# Patient Record
Sex: MALE | Employment: UNEMPLOYED | URBAN - METROPOLITAN AREA
[De-identification: names, ages, dates, MRNs, and addresses within clinical notes are randomized per-mention and may not be internally consistent; named-entity substitution may affect disease eponyms.]

---

## 2023-05-30 ENCOUNTER — OFFICE VISIT (OUTPATIENT)
Dept: ORTHOPEDIC SURGERY | Age: 73
End: 2023-05-30
Payer: MEDICARE

## 2023-05-30 VITALS — WEIGHT: 178 LBS | HEIGHT: 66 IN | BODY MASS INDEX: 28.61 KG/M2

## 2023-05-30 DIAGNOSIS — M51.36 DDD (DEGENERATIVE DISC DISEASE), LUMBAR: ICD-10-CM

## 2023-05-30 DIAGNOSIS — M25.551 RIGHT HIP PAIN: ICD-10-CM

## 2023-05-30 DIAGNOSIS — M47.816 LUMBAR SPONDYLOSIS: ICD-10-CM

## 2023-05-30 DIAGNOSIS — M54.16 LUMBAR RADICULOPATHY: Primary | ICD-10-CM

## 2023-05-30 PROCEDURE — 1123F ACP DISCUSS/DSCN MKR DOCD: CPT | Performed by: PHYSICIAN ASSISTANT

## 2023-05-30 PROCEDURE — 99204 OFFICE O/P NEW MOD 45 MIN: CPT | Performed by: PHYSICIAN ASSISTANT

## 2023-05-30 RX ORDER — HYDROCHLOROTHIAZIDE 25 MG/1
25 TABLET ORAL DAILY
COMMUNITY

## 2023-05-30 RX ORDER — TERBINAFINE HYDROCHLORIDE 250 MG/1
250 TABLET ORAL
COMMUNITY

## 2023-05-30 RX ORDER — OXYBUTYNIN CHLORIDE 10 MG/1
10 TABLET, EXTENDED RELEASE ORAL DAILY
COMMUNITY
Start: 2022-07-05

## 2023-05-30 RX ORDER — DAPAGLIFLOZIN 10 MG/1
TABLET, FILM COATED ORAL
COMMUNITY
Start: 2023-04-29

## 2023-05-30 RX ORDER — TAMSULOSIN HYDROCHLORIDE 0.4 MG/1
0.4 CAPSULE ORAL DAILY
COMMUNITY
Start: 2021-01-21

## 2023-05-30 RX ORDER — LOSARTAN POTASSIUM 50 MG/1
TABLET ORAL
COMMUNITY
Start: 2023-02-28

## 2023-05-30 RX ORDER — LISINOPRIL 10 MG/1
10 TABLET ORAL DAILY
COMMUNITY

## 2023-05-30 RX ORDER — MUPIROCIN CALCIUM 20 MG/G
CREAM TOPICAL 3 TIMES DAILY
COMMUNITY

## 2023-05-30 RX ORDER — GLIMEPIRIDE 4 MG/1
TABLET ORAL
COMMUNITY
Start: 2023-02-28

## 2023-05-30 RX ORDER — MELOXICAM 7.5 MG/1
TABLET ORAL
COMMUNITY
Start: 2023-05-23

## 2023-05-30 RX ORDER — GLIPIZIDE 5 MG/1
TABLET, FILM COATED, EXTENDED RELEASE ORAL
COMMUNITY

## 2023-05-30 RX ORDER — INSULIN LISPRO 100 [IU]/ML
INJECTION, SOLUTION INTRAVENOUS; SUBCUTANEOUS
COMMUNITY

## 2023-05-30 RX ORDER — KETOROLAC TROMETHAMINE 30 MG/ML
INJECTION, SOLUTION INTRAMUSCULAR; INTRAVENOUS
COMMUNITY

## 2023-05-30 RX ORDER — MIRABEGRON 50 MG/1
TABLET, FILM COATED, EXTENDED RELEASE ORAL
COMMUNITY
Start: 2023-05-17

## 2023-05-30 RX ORDER — IBUPROFEN 800 MG/1
TABLET ORAL EVERY 8 HOURS
COMMUNITY

## 2023-05-30 NOTE — PROGRESS NOTES
Name: Jannette Melo  YOB: 1950  Gender: male  MRN: [id number]    CC:   Chief Complaint   Patient presents with    Back Pain         HPI:   Jannette Melo is a 81 y.o. male with a PMHx of multiple comorbidities including diabetic, previous bladder tumor, hypertension. They present here for evaluation of radiating pain to the right hip and leg. Denies any alva LBP or groin pain. History obtained with the assistance of an . Patient has multiple orthopedic complaints including shoulder pain with numbness in his hands as well as feet. He also has hip and leg pain. For the purpose of today's visit we will evaluate the patient's hip and leg symptoms. Patient reports having radiating pain down the right hip thigh to the lower leg for 3 months. He has been undergoing a home exercise program for the last 6 weeks and to Mobic without improvement in his symptoms. His pain is worse with bending and squatting motions as well as at night. Past Medical History Includes: No past medical history on file., No past surgical history on file. Family History: No family history on file.    Social History:   Social History     Tobacco Use    Smoking status: Not on file    Smokeless tobacco: Not on file   Substance Use Topics    Alcohol use: Not on file       ALLERGIES: No Known Allergies     Patient Medications    Current Outpatient Medications   Medication Sig Dispense Refill    tamsulosin (FLOMAX) 0.4 MG capsule Take 1 capsule by mouth daily      oxybutynin (DITROPAN-XL) 10 MG extended release tablet Take 1 tablet by mouth daily      FARXIGA 10 MG tablet       glimepiride (AMARYL) 4 MG tablet       terbinafine (LAMISIL) 250 MG tablet Take 1 tablet by mouth      mupirocin (BACTROBAN) 2 % cream Apply topically 3 times daily      MYRBETRIQ 50 MG TB24       metFORMIN (GLUCOPHAGE) 500 MG tablet       meloxicam (MOBIC) 7.5 MG tablet       losartan (COZAAR) 50 MG tablet       lisinopril (PRINIVIL;ZESTRIL)

## 2023-05-30 NOTE — CONSULTS
Session ID: [id number]  Request ID: [id number]  Language: Bengali  Status: Fulfilled   ID: #[id number]   Name: Paris Gonzales

## 2023-06-08 DIAGNOSIS — M54.16 LUMBAR RADICULOPATHY: Primary | ICD-10-CM

## 2023-06-08 DIAGNOSIS — M47.816 LUMBAR SPONDYLOSIS: ICD-10-CM

## 2023-06-08 DIAGNOSIS — M25.551 RIGHT HIP PAIN: ICD-10-CM

## 2023-06-08 DIAGNOSIS — M51.36 DDD (DEGENERATIVE DISC DISEASE), LUMBAR: ICD-10-CM

## 2023-06-15 ENCOUNTER — HOSPITAL ENCOUNTER (OUTPATIENT)
Dept: CT IMAGING | Age: 73
Discharge: HOME | End: 2023-06-18
Payer: MEDICARE

## 2023-06-15 ENCOUNTER — HOSPITAL ENCOUNTER (OUTPATIENT)
Dept: INTERVENTIONAL RADIOLOGY/VASCULAR | Age: 73
Discharge: HOME | End: 2023-06-18
Payer: MEDICARE

## 2023-06-15 VITALS
HEART RATE: 86 BPM | RESPIRATION RATE: 16 BRPM | BODY MASS INDEX: 28.61 KG/M2 | SYSTOLIC BLOOD PRESSURE: 166 MMHG | HEIGHT: 66 IN | OXYGEN SATURATION: 98 % | WEIGHT: 178 LBS | DIASTOLIC BLOOD PRESSURE: 74 MMHG | TEMPERATURE: 97.9 F

## 2023-06-15 DIAGNOSIS — M54.16 LUMBAR RADICULOPATHY: ICD-10-CM

## 2023-06-15 DIAGNOSIS — M47.816 LUMBAR SPONDYLOSIS: ICD-10-CM

## 2023-06-15 DIAGNOSIS — M51.36 DDD (DEGENERATIVE DISC DISEASE), LUMBAR: ICD-10-CM

## 2023-06-15 PROCEDURE — 62304 MYELOGRAPHY LUMBAR INJECTION: CPT

## 2023-06-15 PROCEDURE — 72132 CT LUMBAR SPINE W/DYE: CPT

## 2023-06-15 PROCEDURE — 2500000003 HC RX 250 WO HCPCS: Performed by: PHYSICIAN ASSISTANT

## 2023-06-15 PROCEDURE — 6360000004 HC RX CONTRAST MEDICATION: Performed by: PHYSICIAN ASSISTANT

## 2023-06-15 RX ORDER — LIDOCAINE HYDROCHLORIDE 20 MG/ML
INJECTION, SOLUTION INFILTRATION; PERINEURAL PRN
Status: COMPLETED | OUTPATIENT
Start: 2023-06-15 | End: 2023-06-15

## 2023-06-15 RX ADMIN — LIDOCAINE HYDROCHLORIDE 5 ML: 20 INJECTION, SOLUTION INFILTRATION; PERINEURAL at 10:46

## 2023-06-15 RX ADMIN — IOPAMIDOL 15 ML: 408 INJECTION, SOLUTION INTRATHECAL at 10:48

## 2023-06-15 ASSESSMENT — PAIN - FUNCTIONAL ASSESSMENT: PAIN_FUNCTIONAL_ASSESSMENT: 0-10

## 2023-06-15 NOTE — DISCHARGE INSTRUCTIONS
If you have any questions about your procedure, please call the Interventional Radiology department at [phone]. After business hours (5pm) and weekends, call the answering service at [phone] and ask for the Radiologist on call to be paged. Si tiene Preguntas acerca del procedimiento, por favor llame al departamento de Radiología Intervencional al [phone]. Después de horas de oficina (5 pm) y los fines de [place], llamar al Mee Fidkofir Gianna al [phone] y pregunte por el Radiologo de Annabel Ellison. Interventional Radiology General Nurse Discharge    After general anesthesia or intravenous sedation, for 24 hours or while taking prescription Narcotics:  Limit your activities  Do not drive and operate hazardous machinery  Do not make important personal or business decisions  Do  not drink alcoholic beverages  If you have not urinated within 8 hours after discharge, please contact your surgeon on call. * Please give a list of your current medications to your Primary Care Provider. * Please update this list whenever your medications are discontinued, doses are     changed, or new medications (including over-the-counter products) are added. * Please carry medication information at all times in case of emergency situations. These are general instructions for a healthy lifestyle:    No smoking/ No tobacco products/ Avoid exposure to second hand smoke  Surgeon General's Warning:  Quitting smoking now greatly reduces serious risk to your health.     Obesity, smoking, and sedentary lifestyle greatly increases your risk for illness  A healthy diet, regular physical exercise & weight monitoring are important for maintaining a healthy lifestyle    You may be retaining fluid if you have a history of heart failure or if you experience any of the following symptoms:  Weight gain of 3 pounds or more overnight or 5 pounds in a week, increased swelling in our hands or feet or shortness of breath
No

## 2023-06-15 NOTE — OR NURSING
TRANSFER - OUT REPORT:           Verbal report given to Christi Soto RN(name) on Divya Person  being transferred to IR Recovery 3(unit) for  routine post-op            Report consisted of patients Situation, Background, Assessment and      Recommendations(SBAR). Information from the following report(s) SBAR and Procedure Summary was reviewed with the receiving nurse. Opportunity for questions and clarification was provided. Pt tolerated procedure well.

## 2023-06-15 NOTE — OR NURSING
Recovery period without difficulty. Pt alert and oriented and denies pain. Dressing is clean, dry, and intact. Reviewed discharge instructions with patient and visitor, both verbalized understanding. Pt escorted to lobby discharge area via wheelchair.

## 2023-06-22 ENCOUNTER — OFFICE VISIT (OUTPATIENT)
Dept: ORTHOPEDIC SURGERY | Age: 73
End: 2023-06-22

## 2023-06-22 VITALS — HEIGHT: 66 IN | BODY MASS INDEX: 28.61 KG/M2 | WEIGHT: 178 LBS

## 2023-06-22 DIAGNOSIS — M48.062 SPINAL STENOSIS OF LUMBAR REGION WITH NEUROGENIC CLAUDICATION: Primary | ICD-10-CM

## 2023-06-22 NOTE — PROGRESS NOTES
06/22/23        Name: Sridhar Mcintosh  YOB: 1950  Gender: male  MRN: [id number]    CC: Follow-up       HPI: Sridhar Mcintosh is a 81 y.o. male who returns for Follow-up       Patient returns the office today for follow-up after CT myelogram.  He presents with an  in person. He was unable to undergo MRI of the spine and hip due to metal in place from a previous cranial surgery. He underwent CT myelogram of the lumbar spine instead and is here to review the results. He reports his symptoms are grossly unchanged. Still having pain in both legs, he is unable to work and perform his duties. His symptoms worsen after short periods of walking and standing for only a few minutes. History was obtained by patient      Meds/PSH/PMH/FH/SH: This information has been reviewed. ALLERGIES: No Known Allergies           Physical Examination:            Imaging:       CT Result (most recent):  CT MYELOGRAM LUMBAR 06/15/2023    Narrative  History: Low back pain. Comments:    CT examination of the lumbar spine was performed after the administration of  intrathecal contrast. Intrathecal contrast was administered to evaluate for  nerve and disc disease. Reformatted images in the sagittal and coronal planes as  well as source axial images were obtained reviewed on a PACs and [employer]  workstation. Radiation reduction dose techniques were used for the study. Our CT scanner use  one or all of the following- Automated exposure control, adjustment of the mA  and/or KV according the patient size, iterative reconstruction. No evidence of fracture and/or subluxation. The limited review of the soft  tissue organs are unremarkable. Vascular calcifications are noted. Conus terminates at T12-L1. T12-L1: Negative    L1-L2: Negative    L2-L3: Broad-based disc bulge with mild ligamentum flavum hypertrophy results in  mild central canal stenosis. No evidence of neural foraminal narrowing.     L3-L4: Broad-based

## 2023-06-27 ENCOUNTER — OFFICE VISIT (OUTPATIENT)
Dept: ORTHOPEDIC SURGERY | Age: 73
End: 2023-06-27
Payer: MEDICARE

## 2023-06-27 DIAGNOSIS — M54.16 LUMBAR RADICULOPATHY: Primary | ICD-10-CM

## 2023-06-27 PROCEDURE — 62323 NJX INTERLAMINAR LMBR/SAC: CPT | Performed by: PHYSICAL MEDICINE & REHABILITATION

## 2023-06-27 RX ORDER — TRIAMCINOLONE ACETONIDE 40 MG/ML
100 INJECTION, SUSPENSION INTRA-ARTICULAR; INTRAMUSCULAR ONCE
Status: COMPLETED | OUTPATIENT
Start: 2023-06-27 | End: 2023-06-27

## 2023-06-27 RX ADMIN — TRIAMCINOLONE ACETONIDE 100 MG: 40 INJECTION, SUSPENSION INTRA-ARTICULAR; INTRAMUSCULAR at 08:36

## 2023-07-12 ENCOUNTER — OFFICE VISIT (OUTPATIENT)
Dept: ORTHOPEDIC SURGERY | Age: 73
End: 2023-07-12

## 2023-07-12 VITALS — BODY MASS INDEX: 28.61 KG/M2 | WEIGHT: 178 LBS | HEIGHT: 66 IN

## 2023-07-12 DIAGNOSIS — M48.062 SPINAL STENOSIS OF LUMBAR REGION WITH NEUROGENIC CLAUDICATION: ICD-10-CM

## 2023-07-12 DIAGNOSIS — M54.16 LUMBAR RADICULOPATHY: Primary | ICD-10-CM

## 2023-07-12 RX ORDER — PREDNISOLONE ACETATE 10 MG/ML
1 SUSPENSION/ DROPS OPHTHALMIC EVERY 8 HOURS
COMMUNITY
Start: 2022-09-13

## 2023-07-12 RX ORDER — ATORVASTATIN CALCIUM 40 MG/1
TABLET, FILM COATED ORAL
COMMUNITY
Start: 2023-06-17

## 2023-07-12 RX ORDER — CYCLOBENZAPRINE HCL 10 MG
TABLET ORAL
COMMUNITY
Start: 2015-08-05

## 2023-07-12 RX ORDER — BROMFENAC SODIUM 0.7 MG/ML
SOLUTION/ DROPS OPHTHALMIC
COMMUNITY
Start: 2022-09-14

## 2023-07-12 RX ORDER — AMLODIPINE BESYLATE 10 MG/1
TABLET ORAL
COMMUNITY
Start: 2023-06-07

## 2023-07-12 RX ORDER — ASPIRIN 81 MG/1
TABLET, CHEWABLE ORAL
COMMUNITY
Start: 2021-03-30

## 2023-07-12 RX ORDER — ACETAMINOPHEN 500 MG
TABLET ORAL
COMMUNITY
Start: 2023-06-06

## 2023-07-12 NOTE — PROGRESS NOTES
07/12/23        Name: Madhu Rojas  YOB: 1950  Gender: male  MRN: [id number]    CC: Follow-up       HPI: Madhu Rojas is a 81 y.o. male who returns for Follow-up     History and examination performed with the help of the . Patient returns the office today after injection. Patient underwent right L4-5 interlaminar epidural steroid injection with Dr. Adeline Soriano. They report greater than 80% improvement in their pain since the injection, and this continues thru today. They feel improvement in their functional abilities and greater comfort with their ADL's. Of note he is still having some of the lumbar x-rays today for continued with prolonged standing or walking. He also notes he is about to start physical therapy for his hands as he has pain and weakness in his arms and hands up to his shoulders. He has no neck pain per se. Meds/PSH/PMH/FH/SH: This information has been reviewed. ALLERGIES: No Known Allergies           Physical Examination:            Imaging:         MRI Result (most recent):  No results found for this or any previous visit from the past 3650 days. Assessment and Plan    Patient had excellent response to the injection. I recommend surveillance for now. Will plan to follow up in 3-6 months for recheck of symptoms for his right hip,leg pain. I did explain to the patient that the weakness of the legs from the spinal stenosis may not be improved fully with the injections and explained them to prevent progression of his symptoms a CT scan dyspnea require surgical decompression of the lumbar spine. The patient is not interested in surgical invention at this time and would like to continue conservative management. I will see him back in 6 to 8 weeks regarding his arms and hands. If he still has pain numbness in his arms and hands. .  You can we can consider evaluating the cervical spine as a possible source of his cervical radiculopathy or

## 2023-08-07 ENCOUNTER — CLINICAL DOCUMENTATION (OUTPATIENT)
Dept: ORTHOPEDIC SURGERY | Age: 73
End: 2023-08-07

## 2023-08-16 ENCOUNTER — TELEPHONE (OUTPATIENT)
Dept: ORTHOPEDIC SURGERY | Age: 73
End: 2023-08-16

## 2023-08-16 ENCOUNTER — OFFICE VISIT (OUTPATIENT)
Dept: ORTHOPEDIC SURGERY | Age: 73
End: 2023-08-16

## 2023-08-16 VITALS — HEIGHT: 66 IN | BODY MASS INDEX: 28.61 KG/M2 | WEIGHT: 178 LBS

## 2023-08-16 DIAGNOSIS — M25.551 RIGHT HIP PAIN: Primary | ICD-10-CM

## 2023-08-16 DIAGNOSIS — M51.36 DDD (DEGENERATIVE DISC DISEASE), LUMBAR: ICD-10-CM

## 2023-08-16 DIAGNOSIS — M47.816 LUMBAR SPONDYLOSIS: ICD-10-CM

## 2023-08-16 DIAGNOSIS — M54.16 LUMBAR RADICULOPATHY: ICD-10-CM

## 2023-08-16 DIAGNOSIS — M48.062 SPINAL STENOSIS OF LUMBAR REGION WITH NEUROGENIC CLAUDICATION: ICD-10-CM

## 2023-08-16 NOTE — TELEPHONE ENCOUNTER
Pt came back for his paperwork and gave him a new referral with contact information to [facility].

## 2023-09-06 ENCOUNTER — OFFICE VISIT (OUTPATIENT)
Dept: ORTHOPEDIC SURGERY | Age: 73
End: 2023-09-06
Payer: MEDICARE

## 2023-09-06 ENCOUNTER — TELEPHONE (OUTPATIENT)
Dept: ORTHOPEDIC SURGERY | Age: 73
End: 2023-09-06

## 2023-09-06 VITALS — HEIGHT: 66 IN | BODY MASS INDEX: 28.61 KG/M2 | WEIGHT: 178 LBS

## 2023-09-06 DIAGNOSIS — R29.898 WEAKNESS OF BOTH HANDS: ICD-10-CM

## 2023-09-06 DIAGNOSIS — M47.812 CERVICAL SPONDYLOSIS: Primary | ICD-10-CM

## 2023-09-06 PROCEDURE — G8427 DOCREV CUR MEDS BY ELIG CLIN: HCPCS | Performed by: PHYSICIAN ASSISTANT

## 2023-09-06 PROCEDURE — 99214 OFFICE O/P EST MOD 30 MIN: CPT | Performed by: PHYSICIAN ASSISTANT

## 2023-09-06 PROCEDURE — 4004F PT TOBACCO SCREEN RCVD TLK: CPT | Performed by: PHYSICIAN ASSISTANT

## 2023-09-06 PROCEDURE — 3017F COLORECTAL CA SCREEN DOC REV: CPT | Performed by: PHYSICIAN ASSISTANT

## 2023-09-06 PROCEDURE — G8419 CALC BMI OUT NRM PARAM NOF/U: HCPCS | Performed by: PHYSICIAN ASSISTANT

## 2023-09-06 PROCEDURE — 1123F ACP DISCUSS/DSCN MKR DOCD: CPT | Performed by: PHYSICIAN ASSISTANT

## 2023-09-06 NOTE — TELEPHONE ENCOUNTER
Spoke with pt mri sent to be done at the hospital due to him having aneurysm clips, pt understands .

## 2023-09-06 NOTE — PROGRESS NOTES
09/06/23        Name: Cliff Grijalva  YOB: 1950  Gender: male  MRN: [id number]    CC: Follow-up       HPI: Cliff Grijalva is a 72 y.o. male who returns for Follow-up         Patient returns the office today for follow-up. He has been in physical therapy for his neck and shoulder pain for approximately 8 weeks. He notes his neck pain and left shoulder pain is better. He continues to have weakness in both hands. No alva numbness or tingling in the hands. It does seem to be a little bit worse in the morning. It does improve somewhat throughout the day. Not having any balance or clumsiness issues. He has tried Mobic during this period of time as well. History was obtained by patient      Meds/PSH/PMH/FH/SH: This information has been reviewed. ALLERGIES: No Known Allergies           Physical Examination:    Diminished reflexes at the biceps, triceps, brachial radialis bilaterally    Sensation intact light touch C4-C8    Full motor on the right with the deltoid, 3+ on the left that appears pain limited primarily  Full motor on the right with biceps and triceps wrist extension and wrist flexion  4+/5 on the left with biceps, -4/5 with triceps on the left, full motor with wrist extension and flexion on the left  2/5 motor with  and interosseous muscles bilaterally      Pain with passive range of motion of the left shoulder. Imaging:     AP and lateral views of the cervical spine reveal severe cervical spondylosis with degenerative disc disease. Loss of lordosis. MRI Result (most recent):  No results found for this or any previous visit from the past 3650 days. Assessment and Plan    The patient brought his  today for the visit. The patient continues to have weakness in the bilateral hands despite 8 weeks of conservative management. He has significant weakness on physical exam today with IO and  strength.   I recommend a cervical MRI to rule out

## 2023-11-13 DIAGNOSIS — R29.898 WEAKNESS OF BOTH HANDS: ICD-10-CM

## 2023-11-13 DIAGNOSIS — M47.812 CERVICAL SPONDYLOSIS: Primary | ICD-10-CM

## 2023-11-20 ENCOUNTER — HOSPITAL ENCOUNTER (OUTPATIENT)
Dept: CT IMAGING | Age: 73
Discharge: HOME | End: 2023-11-23
Payer: MEDICARE

## 2023-11-20 ENCOUNTER — HOSPITAL ENCOUNTER (OUTPATIENT)
Dept: INTERVENTIONAL RADIOLOGY/VASCULAR | Age: 73
Discharge: HOME | End: 2023-11-23
Payer: MEDICARE

## 2023-11-20 VITALS
RESPIRATION RATE: 18 BRPM | TEMPERATURE: 97.9 F | BODY MASS INDEX: 28.61 KG/M2 | OXYGEN SATURATION: 98 % | WEIGHT: 178 LBS | DIASTOLIC BLOOD PRESSURE: 63 MMHG | HEART RATE: 88 BPM | HEIGHT: 66 IN | SYSTOLIC BLOOD PRESSURE: 132 MMHG

## 2023-11-20 DIAGNOSIS — M47.812 CERVICAL SPONDYLOSIS: ICD-10-CM

## 2023-11-20 DIAGNOSIS — R29.898 WEAKNESS OF BOTH HANDS: ICD-10-CM

## 2023-11-20 PROCEDURE — 6360000004 HC RX CONTRAST MEDICATION: Performed by: PHYSICIAN ASSISTANT

## 2023-11-20 PROCEDURE — 62302 MYELOGRAPHY LUMBAR INJECTION: CPT

## 2023-11-20 PROCEDURE — 2709999900 IR MYELOGRAM CERVICAL

## 2023-11-20 PROCEDURE — 2500000003 HC RX 250 WO HCPCS: Performed by: PHYSICIAN ASSISTANT

## 2023-11-20 PROCEDURE — 72126 CT NECK SPINE W/DYE: CPT

## 2023-11-20 RX ORDER — LIDOCAINE HYDROCHLORIDE 20 MG/ML
INJECTION, SOLUTION INFILTRATION; PERINEURAL PRN
Status: COMPLETED | OUTPATIENT
Start: 2023-11-20 | End: 2023-11-20

## 2023-11-20 RX ORDER — IOPAMIDOL 408 MG/ML
INJECTION, SOLUTION INTRATHECAL PRN
Status: COMPLETED | OUTPATIENT
Start: 2023-11-20 | End: 2023-11-20

## 2023-11-20 RX ADMIN — LIDOCAINE HYDROCHLORIDE 5 ML: 20 INJECTION, SOLUTION INFILTRATION; PERINEURAL at 09:41

## 2023-11-20 RX ADMIN — IOPAMIDOL 10 ML: 408 INJECTION, SOLUTION INTRATHECAL at 09:42

## 2023-11-20 ASSESSMENT — PAIN - FUNCTIONAL ASSESSMENT: PAIN_FUNCTIONAL_ASSESSMENT: NONE - DENIES PAIN

## 2023-11-20 NOTE — OR NURSING
Recovery period without difficulty. Pt alert and oriented and denies pain. Dressing is clean, dry, and intact. Reviewed discharge instructions with patient, verbalized understanding.  used for discharge.

## 2023-11-29 ENCOUNTER — OFFICE VISIT (OUTPATIENT)
Dept: ORTHOPEDIC SURGERY | Age: 73
End: 2023-11-29
Payer: MEDICARE

## 2023-11-29 VITALS — BODY MASS INDEX: 28.61 KG/M2 | WEIGHT: 178 LBS | HEIGHT: 66 IN

## 2023-11-29 DIAGNOSIS — R20.0 HAND NUMBNESS: ICD-10-CM

## 2023-11-29 DIAGNOSIS — M54.12 CERVICAL RADICULOPATHY: Primary | ICD-10-CM

## 2023-11-29 PROCEDURE — G8427 DOCREV CUR MEDS BY ELIG CLIN: HCPCS | Performed by: PHYSICIAN ASSISTANT

## 2023-11-29 PROCEDURE — 1123F ACP DISCUSS/DSCN MKR DOCD: CPT | Performed by: PHYSICIAN ASSISTANT

## 2023-11-29 PROCEDURE — 99214 OFFICE O/P EST MOD 30 MIN: CPT | Performed by: PHYSICIAN ASSISTANT

## 2023-11-29 PROCEDURE — 3017F COLORECTAL CA SCREEN DOC REV: CPT | Performed by: PHYSICIAN ASSISTANT

## 2023-11-29 PROCEDURE — G8419 CALC BMI OUT NRM PARAM NOF/U: HCPCS | Performed by: PHYSICIAN ASSISTANT

## 2023-11-29 PROCEDURE — G8484 FLU IMMUNIZE NO ADMIN: HCPCS | Performed by: PHYSICIAN ASSISTANT

## 2023-11-29 PROCEDURE — 4004F PT TOBACCO SCREEN RCVD TLK: CPT | Performed by: PHYSICIAN ASSISTANT

## 2023-11-29 RX ORDER — GABAPENTIN 300 MG/1
300 CAPSULE ORAL 2 TIMES DAILY
Qty: 60 CAPSULE | Refills: 2 | Status: SHIPPED | OUTPATIENT
Start: 2023-11-29 | End: 2024-02-27

## 2023-11-29 NOTE — PROGRESS NOTES
11/29/23        Name: Naeem Roman  YOB: 1950  Gender: male  MRN: [id number]    CC: Follow-up       HPI: Naeem Roman is a 72 y.o. male who returns for Follow-up         Patient returns the office today for follow-up after CT myelogram.  He reports his symptoms are about the same. He continues to have pain and weakness in his hands, and is having difficulty clenching his hands to make a fist without pain. History was obtained by patient with  present      Meds/PSH/PMH/FH/SH: This information has been reviewed. ALLERGIES: No Known Allergies           Physical Examination:    Neuro motor exam reveals full exam on the right upper extremity in general except for  which is 3/5 bilaterally    Left upper extremity motor exam reveals deltoid of 4/5-4+/5 motor, does appear pain limited  Left tricep 4/5  Left bicep 5/5  Wrist extension and flexion are 5/5  Interosseous is 4+/5 on the left   on the left is 3/5      Imaging:     CT Result (most recent):  CT MYELOGRAM CERVICAL 11/20/2023    Narrative  History: Spinal Canal stenosis    Comments:    CT examination of the cervical spine was performed after the administration of  intrathecal contrast. Intrathecal contrast was administered to evaluate for  nerve and disc disease. Reformatted images in the sagittal and coronal planes as  well as source axial images were obtained reviewed on a [employer] and [employer]  workstation. Radiation reduction dose techniques were used for the study. Our CT scanner use  one or all of the following- Automated exposure control, adjustment of the mA  and/or KV according the patient size, iterative reconstruction. Comments: There is no evidence of fracture and/or subluxation. Only the extreme right lung  apex is visualized which demonstrates a few blebs. The soft tissue organs are grossly unremarkable. Vascular calcifications are  noted of the carotid bulbs.     C2-C3: Very mild central disc bulge without

## 2023-12-06 ENCOUNTER — OFFICE VISIT (OUTPATIENT)
Dept: ORTHOPEDIC SURGERY | Age: 73
End: 2023-12-06
Payer: MEDICARE

## 2023-12-06 DIAGNOSIS — M75.81 TENDONITIS OF BOTH ROTATOR CUFFS: Primary | ICD-10-CM

## 2023-12-06 DIAGNOSIS — M75.82 TENDONITIS OF BOTH ROTATOR CUFFS: Primary | ICD-10-CM

## 2023-12-06 DIAGNOSIS — G56.03 BILATERAL CARPAL TUNNEL SYNDROME: ICD-10-CM

## 2023-12-06 PROCEDURE — 99203 OFFICE O/P NEW LOW 30 MIN: CPT | Performed by: ORTHOPAEDIC SURGERY

## 2023-12-06 PROCEDURE — 1123F ACP DISCUSS/DSCN MKR DOCD: CPT | Performed by: ORTHOPAEDIC SURGERY

## 2023-12-06 PROCEDURE — 3017F COLORECTAL CA SCREEN DOC REV: CPT | Performed by: ORTHOPAEDIC SURGERY

## 2023-12-06 PROCEDURE — 1036F TOBACCO NON-USER: CPT | Performed by: ORTHOPAEDIC SURGERY

## 2023-12-06 PROCEDURE — G8427 DOCREV CUR MEDS BY ELIG CLIN: HCPCS | Performed by: ORTHOPAEDIC SURGERY

## 2023-12-06 PROCEDURE — G8484 FLU IMMUNIZE NO ADMIN: HCPCS | Performed by: ORTHOPAEDIC SURGERY

## 2023-12-06 PROCEDURE — G8419 CALC BMI OUT NRM PARAM NOF/U: HCPCS | Performed by: ORTHOPAEDIC SURGERY

## 2023-12-06 NOTE — PROGRESS NOTES
Name: Yo Braswell  YOB: 1950  Gender: male  MRN: [id number]  Age: 72 y.o. Chief Complaint: Bilateral shoulder pain, hand numbness and hand pain  History of present illness: This is a very pleasant 72 y.o. male who presents with history of bilateral shoulder pain that is worse with activity. He states he is not able to lift his arms up over his head without having shoulder pain. He states his pain get 10/10 in severity. He also complains of bilateral hand numbness as well as hand pain. He describes the pain as sharp. He does not describe any pain radiating through his arm. It is very focal.  He has pain with making a fist.  He also states that he wakes up at night and his hand is numb. He does not smoke. He does have diabetes. Medications:     Prior to Visit Medications    Medication Sig Taking? Authorizing Provider   gabapentin (NEURONTIN) 300 MG capsule Take 1 capsule by mouth in the morning and at bedtime for 90 days. FANNIE Smith   aspirin 81 MG chewable tablet Chew 1 tablet every day by oral route as directed for 90 days. Wisam Lopez MD   atorvastatin (LIPITOR) 40 MG tablet   Wisam Lopez MD   Cholecalciferol 50 MCG (2000 UT) TABS Take 1 tablet by mouth daily  Wisam Lopez MD   cyclobenzaprine (FLEXERIL) 10 MG tablet Take 1 tablet twice a day by oral route as needed for 10 days. Wisam Lopez MD   amLODIPine (NORVASC) 10 MG tablet   Wisam Lopez MD   acetaminophen (TYLENOL) 500 MG tablet Take 2 tablets every day by oral route as needed for 30 days.   Wisam Lopez MD   bromfenac (PROLENSA) 0.07 % SOLN Instill 1 drop 1 time a day into operative eye starting three days prior to surgery then continuing until gone after surgery  Wisam Lopez MD   prednisoLONE acetate (PRED FORTE) 1 % ophthalmic suspension Apply 1 drop to eye every 8 (eight) hours  Wisam Lopez MD   FARXIGA 10 MG tablet   John

## 2023-12-07 ENCOUNTER — OFFICE VISIT (OUTPATIENT)
Dept: ORTHOPEDIC SURGERY | Age: 73
End: 2023-12-07
Payer: MEDICARE

## 2023-12-07 VITALS — HEIGHT: 66 IN | BODY MASS INDEX: 27 KG/M2 | WEIGHT: 168 LBS

## 2023-12-07 DIAGNOSIS — M79.642 LEFT HAND PAIN: ICD-10-CM

## 2023-12-07 DIAGNOSIS — M79.641 RIGHT HAND PAIN: Primary | ICD-10-CM

## 2023-12-07 DIAGNOSIS — M25.641 STIFFNESS OF JOINTS OF BOTH HANDS: ICD-10-CM

## 2023-12-07 DIAGNOSIS — M25.642 STIFFNESS OF JOINTS OF BOTH HANDS: ICD-10-CM

## 2023-12-07 PROCEDURE — 1036F TOBACCO NON-USER: CPT | Performed by: ORTHOPAEDIC SURGERY

## 2023-12-07 PROCEDURE — 99203 OFFICE O/P NEW LOW 30 MIN: CPT | Performed by: ORTHOPAEDIC SURGERY

## 2023-12-07 PROCEDURE — G8427 DOCREV CUR MEDS BY ELIG CLIN: HCPCS | Performed by: ORTHOPAEDIC SURGERY

## 2023-12-07 PROCEDURE — G8484 FLU IMMUNIZE NO ADMIN: HCPCS | Performed by: ORTHOPAEDIC SURGERY

## 2023-12-07 PROCEDURE — 1123F ACP DISCUSS/DSCN MKR DOCD: CPT | Performed by: ORTHOPAEDIC SURGERY

## 2023-12-07 PROCEDURE — G8419 CALC BMI OUT NRM PARAM NOF/U: HCPCS | Performed by: ORTHOPAEDIC SURGERY

## 2023-12-07 PROCEDURE — 3017F COLORECTAL CA SCREEN DOC REV: CPT | Performed by: ORTHOPAEDIC SURGERY

## 2023-12-07 NOTE — PROGRESS NOTES
Orthopaedic Hand Clinic Note    Name: Paula Gallagher  YOB: 1950  Gender: male  MRN: [id number]      CC: Patient referred for evaluation of upper extremity pain    HPI: Paula Gallagher is a 72 y.o. male with a chief complaint of pain throughout both hands and difficulty making fists. He denies any finger locking. I questioned him multiple times about whether he experienced any numbness or tingling in his hands, and he denies this. He says symptoms are worst in the morning. Due to language barrier, an  was present during the history-taking and subsequent discussion (and for part of the physical exam) with this patient. .      ROS/Meds/PSH/PMH/FH/SH: I personally reviewed the patients standard intake form. Pertinents are discussed in the HPI    Physical Examination:    Musculoskeletal Exam:  Examination on the bilateral upper extremity demonstrates cap refill < 5 seconds in all fingers, there is no specific tenderness to palpation. There is no appreciable locking or clicking at any A1 pulley. When asked to make a fist, he has a pulp to palm distance of 3cm. He has pain with full passive flexion of the digits. Ring and small fingers are able to reach the distal palmar crease, index and middle fingers are able to be flexed to 1cm from the distal palmar crease. Light touch sensation is subjectively intact throughout    Imaging / Electrodiagnostic Tests:     Hand XR: AP, Lateral, Oblique and Thumb CMC joint     Clinical Indication:  1. Right hand pain    2. Left hand pain    3. Stiffness of joints of both hands           Report: AP, lateral, oblique and thumb CMC joint x-ray of the bilateral hand demonstrates moderate joint space narrowing, subluxation and osteophytic changes of the trapezium consistent with osteoarthritis of the thumb CMC joint. Impression: as above     Brooke Reyes MD           Assessment:     ICD-10-CM    1.  Right hand pain  M79.641 XR HAND BILATERAL MINIMUM 3 VW

## 2023-12-15 ENCOUNTER — EVALUATION (OUTPATIENT)
Age: 73
End: 2023-12-15

## 2023-12-15 DIAGNOSIS — R20.2 NUMBNESS AND TINGLING OF HAND: ICD-10-CM

## 2023-12-15 DIAGNOSIS — R20.0 NUMBNESS AND TINGLING OF HAND: ICD-10-CM

## 2023-12-15 DIAGNOSIS — M79.641 RIGHT HAND PAIN: ICD-10-CM

## 2023-12-15 DIAGNOSIS — M25.641 STIFFNESS OF RIGHT HAND JOINT: Primary | ICD-10-CM

## 2023-12-15 DIAGNOSIS — M79.642 PAIN IN BOTH HANDS: ICD-10-CM

## 2023-12-15 DIAGNOSIS — M79.642 LEFT HAND PAIN: ICD-10-CM

## 2023-12-15 DIAGNOSIS — M62.81 HAND MUSCLE WEAKNESS: ICD-10-CM

## 2023-12-15 DIAGNOSIS — M25.642 STIFFNESS OF LEFT HAND JOINT: ICD-10-CM

## 2023-12-15 DIAGNOSIS — M79.641 PAIN IN BOTH HANDS: ICD-10-CM

## 2023-12-15 NOTE — PROGRESS NOTES
level of function. PLAN OF CARE     Effective Dates: 12/15/2023 TO 2/13/2024 (60 days). Frequency/Duration: 1-2x/week for 60 Day(s)  Interventions may include but are not limited to: (06247) Therapeutic exercise to develop strength and endurance, range of motion, and flexibility, (16452) Manual Therapy:   Consisting of but not limited to hands on treatment by therapist for connective tissue massage, pain management, edema management, joint mobilization and manipulation, manual traction, passive range of motion, soft tissue and neural system mobilization/manipulation and therapeutic massage., (86505 Initial orthotic management and training: Fabrication/adjustments as indicated, (44588) Subsequent orthotic management as indicated, Modalities prn to address pain, spasms, and swelling: (56195) Electrical stimulation - attended  (44929/) Electrical stimulation- unattended  (84956) Ultrasound/phonophoresis  (21217) Hot/cold pack  (65855) Fluidotherapy  (31720) Paraffin, Home exercise program (HEP) development, (59987) Kineseotaping for Neuromuscular Re-education : Muscle inhibition or facilitation, space correction, to improve proprioception,; for endurance or correction of postural stabilizers; addressing trophic changes of complex regional pain syndrome, (33482) Kineseotaping for Manual Therapy Techniques for soft tissue mobilization, facilitation of muscles, pain management, lymphatic management, swelling management, scar mobilization, myofascial correction, mechanical joint correction or support, and (52530) Kineseotaping for Therapeutic Procedure/Exercise  for strengthening or lengthening a muscle. Used in conjunction with retraining posture, endurance and flexibility    The referring physician has reviewed and approved this evaluation and plan of care as noted by the electronic signature attached to note.     GOALS   Short Term Goals:  1/12/2024  (4 weeks)  Patient will be I with HEP   Pain level upon

## 2023-12-26 ENCOUNTER — TREATMENT (OUTPATIENT)
Age: 73
End: 2023-12-26
Payer: MEDICARE

## 2023-12-26 DIAGNOSIS — R20.2 NUMBNESS AND TINGLING OF HAND: ICD-10-CM

## 2023-12-26 DIAGNOSIS — M79.641 PAIN IN BOTH HANDS: ICD-10-CM

## 2023-12-26 DIAGNOSIS — M25.642 STIFFNESS OF LEFT HAND JOINT: ICD-10-CM

## 2023-12-26 DIAGNOSIS — M79.642 PAIN IN BOTH HANDS: ICD-10-CM

## 2023-12-26 DIAGNOSIS — M62.81 HAND MUSCLE WEAKNESS: ICD-10-CM

## 2023-12-26 DIAGNOSIS — M25.641 STIFFNESS OF RIGHT HAND JOINT: Primary | ICD-10-CM

## 2023-12-26 DIAGNOSIS — R20.0 NUMBNESS AND TINGLING OF HAND: ICD-10-CM

## 2023-12-26 PROCEDURE — 97018 PARAFFIN BATH THERAPY: CPT | Performed by: OCCUPATIONAL THERAPIST

## 2023-12-26 PROCEDURE — 97110 THERAPEUTIC EXERCISES: CPT | Performed by: OCCUPATIONAL THERAPIST

## 2024-01-03 NOTE — PROGRESS NOTES
file.   Medications. : Reviewed in chart  Allergies: No Known Allergies     SUBJECTIVE     Current Symptoms/Chief complaints:   Chief Complaint   Patient presents with    Hand Pain     Neuro screen: c/o numbness median nerve distrubution    Patient Stated Goals: \"Be able to use my hands\"    OBJECTIVE     Functional Outcome Measures: Quick Dash  43 score=   73 % functional deficit  Hand/Side Dominance: right handed       A/PROM Measures:  Shoulder A/PROM RIGHT   IE LEFT  IE PROM: IE  involved side                 Shoulder ext/flex        Shoulder IR/ER       Shoulder Add/Abd       Shoulder screen ~90 deg flex/abd ~90 deg flex/abd                     Elbow  A/PROM RIGHT  IE LEFT  IE PROM:  IE  involved side    Elbow extension/flexion       Elbow/Forearm Screen WFL WFL       Forearm/Wrist A/PROM RIGHT  IE LEFT  IE PROM : IE  involved side    Pronation/Supination WFL WFL     Wrist Extension/Flexion 45/35 50/55     RD/UD         Thumb A/PROM: RIGHT  IE LEFT  IE PROM:  IE  involved side    MP ext/flex       IP ext/flex       Radial add/abduction       Palmar add/abduction       Opposition (Zac): 6 6       Digital AROM:IE RIGHT IF MF RF SF   MCP -10/35 -15/42 -10/62 -5/75   PIP -10/65 -20/70 -10/67 -8/60   DIP 0/35 0/40 0/25 0/30   Flexion to DPC         Digital AROM: IE LEFT IF MF RF SF   MCP -17/60 0/62 0/50 0/40   PIP 0/47 -20/50 -15/52 -15/60   DIP 0/45 0/45 0/40 0/45   Flexion to DPC           /Pinch Strength  Strength (psi): RIGHT  IE LEFT  IE      Position II 30 15     Lat Pinch: NT      2pt pinch: NT      3pt pinch: NT            Treatment:  Paraffin Dip to  bilateral hands, (40261) x 10 minutes, to increase tissue extensibility/decrease pain  to prepare for treatment.     Therapeutic exercise (81957) x 30 min:  Home Exercise Program review  OT POC and rationale, education for pain management, edema management  STM  PROM all joints, gentle jt mobs  Opposition with foam blocks, bilateral  Yellow

## 2024-01-04 ENCOUNTER — TREATMENT (OUTPATIENT)
Age: 74
End: 2024-01-04
Payer: MEDICARE

## 2024-01-04 DIAGNOSIS — M79.641 PAIN IN BOTH HANDS: ICD-10-CM

## 2024-01-04 DIAGNOSIS — R20.2 NUMBNESS AND TINGLING OF HAND: ICD-10-CM

## 2024-01-04 DIAGNOSIS — R20.0 NUMBNESS AND TINGLING OF HAND: ICD-10-CM

## 2024-01-04 DIAGNOSIS — M25.642 STIFFNESS OF LEFT HAND JOINT: ICD-10-CM

## 2024-01-04 DIAGNOSIS — M25.641 STIFFNESS OF RIGHT HAND JOINT: Primary | ICD-10-CM

## 2024-01-04 DIAGNOSIS — M79.642 PAIN IN BOTH HANDS: ICD-10-CM

## 2024-01-04 PROCEDURE — 97110 THERAPEUTIC EXERCISES: CPT | Performed by: OCCUPATIONAL THERAPIST

## 2024-01-04 PROCEDURE — 97018 PARAFFIN BATH THERAPY: CPT | Performed by: OCCUPATIONAL THERAPIST

## 2024-01-08 ENCOUNTER — OFFICE VISIT (OUTPATIENT)
Dept: ORTHOPEDIC SURGERY | Age: 74
End: 2024-01-08

## 2024-01-08 DIAGNOSIS — M19.019 AC JOINT ARTHROPATHY: ICD-10-CM

## 2024-01-08 DIAGNOSIS — M75.82 TENDONITIS OF BOTH ROTATOR CUFFS: Primary | ICD-10-CM

## 2024-01-08 DIAGNOSIS — M75.81 TENDONITIS OF BOTH ROTATOR CUFFS: Primary | ICD-10-CM

## 2024-01-08 RX ORDER — METHYLPREDNISOLONE ACETATE 40 MG/ML
40 INJECTION, SUSPENSION INTRA-ARTICULAR; INTRALESIONAL; INTRAMUSCULAR; SOFT TISSUE ONCE
Status: COMPLETED | OUTPATIENT
Start: 2024-01-08 | End: 2024-01-08

## 2024-01-08 RX ADMIN — METHYLPREDNISOLONE ACETATE 40 MG: 40 INJECTION, SUSPENSION INTRA-ARTICULAR; INTRALESIONAL; INTRAMUSCULAR; SOFT TISSUE at 11:51

## 2024-01-10 NOTE — PROGRESS NOTES
containers  Pt will wake up in the morning with a pain level of 3/10 or less in bilateral hands  Pt will be able to sleep through the night without being awaken by pain in bilateral hands  Pt will be able to use tools for short periods of time without increasing pain in bilateral hands.  Quick DASH assessment score will be less than a 30% functional deficit    [employer] Portal   Access Code: [email or web address]  URL: [email or web address]  Date: 12/15/2023  Prepared by: Wander Mckeon    Exercises bilateral hands  - Seated Elbow Flexion and Extension AROM  - 2 x daily - 7 x weekly - 1 sets - 10 reps - 5 hold  - Seated Forearm Pronation and Supination AROM  - 2 x daily - 7 x weekly - 1 sets - 10 reps - 5 hold  - Wrist Flexion Extension AROM - Palms Down  - 2 x daily - 7 x weekly - 1 sets - 10 reps - 5 hold  - Seated Wrist Radial and Ulnar Deviation AROM  - 2 x daily - 7 x weekly - 1 sets - 10 reps - 5 hold  - Seated Finger Composite Flexion Extension  - 5 x daily - 7 x weekly - 1 sets - 10 reps - 5 hold  - Finger MP Flexion AROM  - 5 x daily - 7 x weekly - 1 sets - 10 reps - 5 hold  - Seated Claw Fist AROM  - 5 x daily - 7 x weekly - 1 sets - 10 reps - 5 hold  - Individual Finger Extensions  - 5 x daily - 7 x weekly - 1 sets - 10 reps - 5 hold  - Thumb Opposition  - 5 x daily - 7 x weekly - 1 sets - 10 reps - 5 hold  - Finger Spreading  - 5 x daily - 7 x weekly - 1 sets - 10 reps - 5 hold    - wrist splints (information provided, pt to purchase) to don at night  - Isotoner gloves to don at night with splints (issued)  - Warm water soaks or moist heating pad in a.m. and prior to exercises.  OT Protocols

## 2024-01-11 ENCOUNTER — TREATMENT (OUTPATIENT)
Age: 74
End: 2024-01-11

## 2024-01-11 DIAGNOSIS — M62.81 HAND MUSCLE WEAKNESS: ICD-10-CM

## 2024-01-11 DIAGNOSIS — M25.642 STIFFNESS OF LEFT HAND JOINT: ICD-10-CM

## 2024-01-11 DIAGNOSIS — R20.0 NUMBNESS AND TINGLING OF HAND: ICD-10-CM

## 2024-01-11 DIAGNOSIS — R20.2 NUMBNESS AND TINGLING OF HAND: ICD-10-CM

## 2024-01-11 DIAGNOSIS — M79.641 PAIN IN BOTH HANDS: ICD-10-CM

## 2024-01-11 DIAGNOSIS — M25.641 STIFFNESS OF RIGHT HAND JOINT: Primary | ICD-10-CM

## 2024-01-11 DIAGNOSIS — M79.642 PAIN IN BOTH HANDS: ICD-10-CM

## 2024-01-13 PROBLEM — M25.511 PAIN IN JOINT OF RIGHT SHOULDER: Status: ACTIVE | Noted: 2023-04-28

## 2024-01-13 PROBLEM — G89.29 CHRONIC LOW BACK PAIN: Status: ACTIVE | Noted: 2023-04-28

## 2024-01-13 PROBLEM — M54.50 CHRONIC LOW BACK PAIN: Status: ACTIVE | Noted: 2023-04-28

## 2024-01-13 PROBLEM — R19.5 OCCULT BLOOD IN STOOLS: Status: ACTIVE | Noted: 2018-10-25

## 2024-01-13 PROBLEM — M19.90 OSTEOARTHROSIS: Status: ACTIVE | Noted: 2023-06-06

## 2024-01-13 PROBLEM — L20.9 ATOPIC DERMATITIS: Status: ACTIVE | Noted: 2021-06-22

## 2024-01-13 PROBLEM — M25.551 PAIN OF RIGHT HIP JOINT: Status: ACTIVE | Noted: 2023-04-28

## 2024-01-13 PROBLEM — K21.9 GERD (GASTROESOPHAGEAL REFLUX DISEASE): Status: ACTIVE | Noted: 2018-10-25

## 2024-01-13 PROBLEM — R35.0 URINARY FREQUENCY: Status: ACTIVE | Noted: 2020-09-24

## 2024-01-13 PROBLEM — M54.9 BACKACHE: Status: ACTIVE | Noted: 2023-07-05

## 2024-01-13 PROBLEM — R80.9 MICROALBUMINURIA: Status: ACTIVE | Noted: 2019-02-07

## 2024-01-13 PROBLEM — E11.9 DIABETES MELLITUS (HCC): Status: ACTIVE | Noted: 2019-01-24

## 2024-01-13 PROBLEM — H25.9 AGE-RELATED CATARACT OF BOTH EYES: Status: ACTIVE | Noted: 2019-01-24

## 2024-01-13 PROBLEM — E66.3 OVERWEIGHT WITH BODY MASS INDEX (BMI) 25.0-29.9: Status: ACTIVE | Noted: 2023-06-06

## 2024-01-13 PROBLEM — M71.10 INFECTION OF BURSA: Status: ACTIVE | Noted: 2023-07-05

## 2024-01-13 PROBLEM — M70.50 BURSITIS OF KNEE: Status: ACTIVE | Noted: 2023-07-05

## 2024-01-13 PROBLEM — H52.4 PRESBYOPIA: Status: ACTIVE | Noted: 2019-01-24

## 2024-01-13 PROBLEM — K63.5 POLYP OF COLON: Status: ACTIVE | Noted: 2020-03-14

## 2024-01-13 PROBLEM — H26.9 CATARACT: Status: ACTIVE | Noted: 2021-03-30

## 2024-01-13 PROBLEM — B35.1 ONYCHOMYCOSIS OF TOENAIL: Status: ACTIVE | Noted: 2018-10-27

## 2024-01-13 PROBLEM — E66.9 OBESITY: Status: ACTIVE | Noted: 2023-07-05

## 2024-01-13 PROBLEM — C67.9 MALIGNANT NEOPLASM OF URINARY BLADDER (HCC): Status: ACTIVE | Noted: 2021-03-29

## 2024-01-25 ENCOUNTER — TREATMENT (OUTPATIENT)
Age: 74
End: 2024-01-25

## 2024-01-25 ENCOUNTER — TELEPHONE (OUTPATIENT)
Age: 74
End: 2024-01-25

## 2024-01-25 DIAGNOSIS — R20.2 NUMBNESS AND TINGLING OF HAND: ICD-10-CM

## 2024-01-25 DIAGNOSIS — M25.642 STIFFNESS OF LEFT HAND JOINT: ICD-10-CM

## 2024-01-25 DIAGNOSIS — M79.642 LEFT HAND PAIN: ICD-10-CM

## 2024-01-25 DIAGNOSIS — M79.642 PAIN IN BOTH HANDS: ICD-10-CM

## 2024-01-25 DIAGNOSIS — M79.641 RIGHT HAND PAIN: ICD-10-CM

## 2024-01-25 DIAGNOSIS — M25.641 STIFFNESS OF RIGHT HAND JOINT: Primary | ICD-10-CM

## 2024-01-25 DIAGNOSIS — M62.81 HAND MUSCLE WEAKNESS: ICD-10-CM

## 2024-01-25 DIAGNOSIS — M79.641 PAIN IN BOTH HANDS: ICD-10-CM

## 2024-01-25 DIAGNOSIS — R20.0 NUMBNESS AND TINGLING OF HAND: ICD-10-CM

## 2024-01-25 NOTE — TELEPHONE ENCOUNTER
LVM for patient via Ernesto with  services [phone] to resc appt as patient chose that option on appt confirmation call.    no

## 2024-01-25 NOTE — PROGRESS NOTES
[facility]  [address] [place] [id number]  Dept: [phone]      Occupational Therapy Discharge Note     Visit completed using  via virtual      Referring MD: Salomón, Cindy MEDRANO MD    Diagnosis:     ICD-10-CM    1. Stiffness of right hand joint  M25.641       2. Stiffness of left hand joint  M25.642       3. Pain in both hands  M79.641     M79.642       4. Numbness and tingling of hand  R20.0     R20.2       5. Left hand pain [M79.642]  M79.642       6. Right hand pain [M79.641]  M79.641       7. Hand muscle weakness  M62.81              Surgery/Medical Dx: Date NA      Therapy precautions: None    History of injury/onset : Pt states he began to have pain in bilateral hands about 3-4 months ago. 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 He began waking up in the mornings with stiff painful hands and had to change his profession when his hands became weak and painful. He was unable to grasp tools and was dropping objects. He states the pain is worse in the mornings. He is unable to make a full fist with bilateral hands. 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 He has limited ROM.    Total Direct Treatment Time: 20 min  Total In Office Time: 30 min  Modifier needed: No  Episode visit count:  6     [facility] Additional Auth Info  Nature of condition: Recent onset (initial onset within last 3 months)  Describe current symptoms: numbness, pain, stiffness bilateral hands    Average Pain/symptom intensity (0-10 scale)   Last 24 hours: fluctuates from 2-3/10 to 8/10 depending on time of day   Last week (1-7 days): 8/10  How often do you feel symptoms? Constant (% of time)    How much have your symptoms interfered with daily activities? Quite a bit  How has your condition changed since receiving care at this facility? N/A-- Initial Visit  In general, would you say your current overall health is good     Functional Outcome Measures: DASH:  43/11= 73% functional

## 2024-02-05 ENCOUNTER — OFFICE VISIT (OUTPATIENT)
Dept: ORTHOPEDIC SURGERY | Age: 74
End: 2024-02-05
Payer: MEDICARE

## 2024-02-05 DIAGNOSIS — M75.82 TENDONITIS OF BOTH ROTATOR CUFFS: Primary | ICD-10-CM

## 2024-02-05 DIAGNOSIS — M25.512 LEFT SHOULDER PAIN, UNSPECIFIED CHRONICITY: ICD-10-CM

## 2024-02-05 DIAGNOSIS — M19.019 AC JOINT ARTHROPATHY: ICD-10-CM

## 2024-02-05 DIAGNOSIS — M75.81 TENDONITIS OF BOTH ROTATOR CUFFS: Primary | ICD-10-CM

## 2024-02-05 PROCEDURE — 99214 OFFICE O/P EST MOD 30 MIN: CPT | Performed by: PHYSICIAN ASSISTANT

## 2024-02-05 PROCEDURE — 1123F ACP DISCUSS/DSCN MKR DOCD: CPT | Performed by: PHYSICIAN ASSISTANT

## 2024-03-04 ENCOUNTER — CLINICAL DOCUMENTATION (OUTPATIENT)
Dept: ORTHOPEDIC SURGERY | Age: 74
End: 2024-03-04

## 2024-03-06 ENCOUNTER — OFFICE VISIT (OUTPATIENT)
Dept: ORTHOPEDIC SURGERY | Age: 74
End: 2024-03-06

## 2024-03-06 DIAGNOSIS — M19.019 AC JOINT ARTHROPATHY: ICD-10-CM

## 2024-03-06 DIAGNOSIS — M75.82 TENDONITIS OF BOTH ROTATOR CUFFS: Primary | ICD-10-CM

## 2024-03-06 DIAGNOSIS — M25.512 LEFT SHOULDER PAIN, UNSPECIFIED CHRONICITY: ICD-10-CM

## 2024-03-06 DIAGNOSIS — M75.81 TENDONITIS OF BOTH ROTATOR CUFFS: Primary | ICD-10-CM
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
## 2024-03-08 ENCOUNTER — HOSPITAL ENCOUNTER (OUTPATIENT)
Dept: INTERVENTIONAL RADIOLOGY/VASCULAR | Age: 74
End: 2024-03-08
Attending: ORTHOPAEDIC SURGERY
Payer: MEDICARE

## 2024-03-08 ENCOUNTER — HOSPITAL ENCOUNTER (OUTPATIENT)
Dept: CT IMAGING | Age: 74
End: 2024-03-08
Attending: ORTHOPAEDIC SURGERY
Payer: MEDICARE

## 2024-03-08 VITALS
RESPIRATION RATE: 18 BRPM | BODY MASS INDEX: 27 KG/M2 | HEART RATE: 84 BPM | OXYGEN SATURATION: 97 % | HEIGHT: 66 IN | TEMPERATURE: 97.8 F | SYSTOLIC BLOOD PRESSURE: 125 MMHG | WEIGHT: 168 LBS | DIASTOLIC BLOOD PRESSURE: 60 MMHG

## 2024-03-08 DIAGNOSIS — M19.019 AC JOINT ARTHROPATHY: ICD-10-CM

## 2024-03-08 DIAGNOSIS — M25.512 LEFT SHOULDER PAIN, UNSPECIFIED CHRONICITY: ICD-10-CM

## 2024-03-08 DIAGNOSIS — M75.82 TENDONITIS OF BOTH ROTATOR CUFFS: ICD-10-CM

## 2024-03-08 DIAGNOSIS — M75.81 TENDONITIS OF BOTH ROTATOR CUFFS: ICD-10-CM

## 2024-03-08 PROCEDURE — 77002 NEEDLE LOCALIZATION BY XRAY: CPT | Performed by: RADIOLOGY

## 2024-03-08 PROCEDURE — 73201 CT UPPER EXTREMITY W/DYE: CPT

## 2024-03-08 PROCEDURE — 6360000004 HC RX CONTRAST MEDICATION: Performed by: RADIOLOGY

## 2024-03-08 PROCEDURE — 23350 INJECTION FOR SHOULDER X-RAY: CPT | Performed by: RADIOLOGY

## 2024-03-08 PROCEDURE — 77002 NEEDLE LOCALIZATION BY XRAY: CPT

## 2024-03-08 RX ORDER — IOPAMIDOL 612 MG/ML
INJECTION, SOLUTION INTRATHECAL PRN
Status: COMPLETED | OUTPATIENT
Start: 2024-03-08 | End: 2024-03-08

## 2024-03-08 RX ADMIN — IOPAMIDOL 5 ML: 612 INJECTION, SOLUTION INTRATHECAL at 12:15

## 2024-03-08 ASSESSMENT — PAIN - FUNCTIONAL ASSESSMENT: PAIN_FUNCTIONAL_ASSESSMENT: NONE - DENIES PAIN

## 2024-03-08 NOTE — OR NURSING
Recovery period without difficulty. Pt alert and oriented and denies pain. Dressing is clean, dry, and intact. Reviewed discharge instructions with patient and he, verbalized understanding. Pt escorted to lobby discharge area via wheelchair. Vital signs and Leandro score completed.     Pt escorted to CT room after dressing.    Discharge instructions also given in english AND Grenadian

## 2024-03-08 NOTE — DISCHARGE INSTRUCTIONS
If you have any questions about your procedure, please call the Interventional Radiology department at [phone].   After business hours (5pm) and weekends, call the answering service at [phone] and ask for the Radiologist on call to be paged.     Si tiene Preguntas acerca del procedimiento, por favor llame al departamento de Radiología Intervencional al [phone].   Después de horas de oficina (5 pm) y los fines de semana, llamar al servicio de llamadas al [phone] y pregunte por el Radiologo de radha.      Interventional Radiology General Nurse Discharge    After general anesthesia or intravenous sedation, for 24 hours or while taking prescription Narcotics:  Limit your activities  Do not drive and operate hazardous machinery  Do not make important personal or business decisions  Do  not drink alcoholic beverages  If you have not urinated within 8 hours after discharge, please contact your surgeon on call.    * Please give a list of your current medications to your Primary Care Provider.  * Please update this list whenever your medications are discontinued, doses are     changed, or new medications (including over-the-counter products) are added.  * Please carry medication information at all times in case of emergency situations.    These are general instructions for a healthy lifestyle:    No smoking/ No tobacco products/ Avoid exposure to second hand smoke  Surgeon General's Warning:  Quitting smoking now greatly reduces serious risk to your health.    Obesity, smoking, and sedentary lifestyle greatly increases your risk for illness  A healthy diet, regular physical exercise & weight monitoring are important for maintaining a healthy lifestyle    You may be retaining fluid if you have a history of heart failure or if you experience any of the following symptoms:  Weight gain of 3 pounds or more overnight or 5 pounds in a week, increased swelling in our hands or feet or shortness of breath  while lying flat in bed.  Please call your doctor as soon as you notice any of these symptoms; do not wait until your next office visit.    Recognize signs and symptoms of STROKE:  F-face looks uneven    A-arms unable to move or move unevenly    S-speech slurred or non-existent    T-time-call 911 as soon as signs and symptoms begin-DO NOT go       Back to bed or wait to see if you get better-TIME IS BRAIN.

## 2024-03-15 ENCOUNTER — CLINICAL DOCUMENTATION (OUTPATIENT)
Dept: ORTHOPEDIC SURGERY | Age: 74
End: 2024-03-15

## 2024-03-28 NOTE — PROGRESS NOTES
Name: Shadi Eid  YOB: 1950  Gender: male  MRN: [id number]    CC:   Chief Complaint   Patient presents with    Follow-up     CT results left shoulder   Left shoulder pain    HPI:  This patient presents as a follow up evaluation of the left shoulder after CT results.  The patient has previously been seen by my PA.  The patient was initially seen by Dr. Cleveland who referred over for shoulder evaluation.  The patient was given an AC injection on 1/8/24 which gave short term relief.  Patient was found to have mild degenerative changes and decreased ROM.  Complains of pain superiorly and some laterally.  He works as a .  Originally from [place].  Recall from their initial visit: 15 year history of Left and right shoulder pain and limited motion. Patient states he had surgery on a brain aneurysm in 2009 and has had shoulder pain since. He states his left shoulder is worse than his right shoulder. The pain is located superiorly and laterally down the arm with occasional numbness. The patient denies any neck pain. Patient states the pain is a constant sharp pain and gets worse with overhead movements. Patient has tried oral anti-inflammatories with minimal relief. He has also been to physical therapy which has helped with the numbness in his fingers.   No Known Allergies  No past medical history on file.  No past surgical history on file.  No family history on file.  Social History     Socioeconomic History    Marital status:      Spouse name: Not on file    Number of children: Not on file    Years of education: Not on file    Highest education level: Not on file   Occupational History    Not on file   Tobacco Use    Smoking status: Never     Passive exposure: Never    Smokeless tobacco: Never   Substance and Sexual Activity    Alcohol use: Not on file    Drug use: Never    Sexual activity: Not on file   Other Topics Concern    Not on file   Social History Narrative    Not on file     Social

## 2024-04-03 ENCOUNTER — OFFICE VISIT (OUTPATIENT)
Dept: ORTHOPEDIC SURGERY | Age: 74
End: 2024-04-03
Payer: MEDICARE

## 2024-04-03 DIAGNOSIS — M75.102 TEAR OF LEFT ROTATOR CUFF, UNSPECIFIED TEAR EXTENT, UNSPECIFIED WHETHER TRAUMATIC: ICD-10-CM

## 2024-04-03 DIAGNOSIS — M25.512 LEFT SHOULDER PAIN, UNSPECIFIED CHRONICITY: Primary | ICD-10-CM

## 2024-04-03 DIAGNOSIS — M19.012 OSTEOARTHRITIS OF LEFT AC (ACROMIOCLAVICULAR) JOINT: ICD-10-CM

## 2024-04-03 PROCEDURE — 99214 OFFICE O/P EST MOD 30 MIN: CPT | Performed by: ORTHOPAEDIC SURGERY

## 2024-04-03 PROCEDURE — 1123F ACP DISCUSS/DSCN MKR DOCD: CPT | Performed by: ORTHOPAEDIC SURGERY

## 2024-07-23 LAB
ESTIMATED AVERAGE GLUCOSE: NORMAL
HBA1C MFR BLD: 6.2 %

## 2024-09-10 DIAGNOSIS — G56.03 BILATERAL CARPAL TUNNEL SYNDROME: Primary | ICD-10-CM

## 2024-09-16 ENCOUNTER — OFFICE VISIT (OUTPATIENT)
Dept: ORTHOPEDIC SURGERY | Age: 74
End: 2024-09-16
Payer: MEDICARE

## 2024-09-16 VITALS — BODY MASS INDEX: 27 KG/M2 | HEIGHT: 66 IN | WEIGHT: 168 LBS

## 2024-09-16 DIAGNOSIS — M48.062 SPINAL STENOSIS OF LUMBAR REGION WITH NEUROGENIC CLAUDICATION: Primary | ICD-10-CM

## 2024-09-16 PROCEDURE — 99214 OFFICE O/P EST MOD 30 MIN: CPT | Performed by: PHYSICIAN ASSISTANT

## 2024-09-16 PROCEDURE — G2211 COMPLEX E/M VISIT ADD ON: HCPCS | Performed by: PHYSICIAN ASSISTANT

## 2024-09-16 PROCEDURE — 1123F ACP DISCUSS/DSCN MKR DOCD: CPT | Performed by: PHYSICIAN ASSISTANT

## 2024-09-23 ENCOUNTER — OFFICE VISIT (OUTPATIENT)
Dept: ORTHOPEDIC SURGERY | Age: 74
End: 2024-09-23
Payer: MEDICARE

## 2024-09-23 DIAGNOSIS — M54.17 LUMBOSACRAL RADICULOPATHY: Primary | ICD-10-CM

## 2024-09-23 PROCEDURE — 62323 NJX INTERLAMINAR LMBR/SAC: CPT | Performed by: PHYSICAL MEDICINE & REHABILITATION

## 2024-09-23 RX ORDER — TRIAMCINOLONE ACETONIDE 40 MG/ML
100 INJECTION, SUSPENSION INTRA-ARTICULAR; INTRAMUSCULAR ONCE
Status: COMPLETED | OUTPATIENT
Start: 2024-09-23 | End: 2024-09-23

## 2024-09-23 RX ADMIN — TRIAMCINOLONE ACETONIDE 100 MG: 40 INJECTION, SUSPENSION INTRA-ARTICULAR; INTRAMUSCULAR at 14:10

## 2025-04-04 ENCOUNTER — OFFICE VISIT (OUTPATIENT)
Dept: NEUROLOGY | Age: 75
End: 2025-04-04
Payer: MEDICARE

## 2025-04-04 VITALS
HEIGHT: 66 IN | OXYGEN SATURATION: 96 % | DIASTOLIC BLOOD PRESSURE: 68 MMHG | WEIGHT: 148 LBS | SYSTOLIC BLOOD PRESSURE: 132 MMHG | BODY MASS INDEX: 23.78 KG/M2 | HEART RATE: 84 BPM

## 2025-04-04 DIAGNOSIS — R41.3 MEMORY DEFICITS: ICD-10-CM

## 2025-04-04 DIAGNOSIS — G93.89 ENCEPHALOMALACIA ON IMAGING STUDY: ICD-10-CM

## 2025-04-04 DIAGNOSIS — I67.1 SACCULAR ANEURYSM: Primary | ICD-10-CM

## 2025-04-04 LAB
FOLATE SERPL-MCNC: 33.6 NG/ML (ref 3.1–17.5)
TSH W FREE THYROID IF ABNORMAL: 1.64 UIU/ML (ref 0.27–4.2)

## 2025-04-04 PROCEDURE — 99205 OFFICE O/P NEW HI 60 MIN: CPT | Performed by: PSYCHIATRY & NEUROLOGY

## 2025-04-04 PROCEDURE — 1160F RVW MEDS BY RX/DR IN RCRD: CPT | Performed by: PSYCHIATRY & NEUROLOGY

## 2025-04-04 PROCEDURE — G8427 DOCREV CUR MEDS BY ELIG CLIN: HCPCS | Performed by: PSYCHIATRY & NEUROLOGY

## 2025-04-04 PROCEDURE — 1159F MED LIST DOCD IN RCRD: CPT | Performed by: PSYCHIATRY & NEUROLOGY

## 2025-04-04 PROCEDURE — 1036F TOBACCO NON-USER: CPT | Performed by: PSYCHIATRY & NEUROLOGY

## 2025-04-04 PROCEDURE — 3078F DIAST BP <80 MM HG: CPT | Performed by: PSYCHIATRY & NEUROLOGY

## 2025-04-04 PROCEDURE — 3017F COLORECTAL CA SCREEN DOC REV: CPT | Performed by: PSYCHIATRY & NEUROLOGY

## 2025-04-04 PROCEDURE — 3075F SYST BP GE 130 - 139MM HG: CPT | Performed by: PSYCHIATRY & NEUROLOGY

## 2025-04-04 PROCEDURE — 1123F ACP DISCUSS/DSCN MKR DOCD: CPT | Performed by: PSYCHIATRY & NEUROLOGY

## 2025-04-04 PROCEDURE — G8420 CALC BMI NORM PARAMETERS: HCPCS | Performed by: PSYCHIATRY & NEUROLOGY

## 2025-04-04 RX ORDER — SITAGLIPTIN AND METFORMIN HYDROCHLORIDE 1000; 50 MG/1; MG/1
1 TABLET, FILM COATED ORAL 2 TIMES DAILY
COMMUNITY

## 2025-04-04 NOTE — PROGRESS NOTES
Shadi was seen today for new patient.    Diagnoses and all orders for this visit:    Saccular aneurysm  -     External Referral to Neurosurgery    Encephalomalacia on imaging study    Memory deficits  -     Vitamin B1, Whole Blood; Future  -     Vitamin B12 w/Reflex; Future  -     Folate; Future  -     TSH reflex to FT4; Future    Patient had left AMIRAH aneurysm clipping, stable clinically. Main complaint currently is memory concerns, sometimes described episodes of disorientation concerning for focal seizures. Will do routine EEG.    Referral to endovascular specialty group at [facility] - due to increased size of aneurysm from 2010.  \"On this exam there is a saccular aneurysm measuring 6 x 4 mm in the axial plane series 505 image 134. On the comparison exam this aneurysm measures 3 x 2 mm in that position.\"      Return if symptoms worsen or fail to improve.    Craig Lua MD  Epilepsy & Consultation Neurology  [address], [place] [address]  Phone: [phone]    All medications and relevant precautions were fully reviewed with the patient. More than 50% of this visit was used to evaluate, educate and coordinate care for the patient for the above concerns. Total visit time: 61 minutes. Time includes pre- and post- face-to-face time, including record review of available pertinent images and reports. I have written all aspects of this note, parts of which were produced using speech recognition software, which may contain inadvertent errors in the produced words.

## 2025-04-06 LAB — VIT B12 SERPL-MCNC: 518 PG/ML (ref 232–1245)

## 2025-04-07 LAB — VIT B1 BLD-SCNC: 262.5 NMOL/L (ref 66.5–200)

## 2025-04-11 ENCOUNTER — RESULTS FOLLOW-UP (OUTPATIENT)
Dept: NEUROLOGY | Age: 75
End: 2025-04-11

## 2025-04-15 ENCOUNTER — PROCEDURE VISIT (OUTPATIENT)
Dept: NEUROLOGY | Age: 75
End: 2025-04-15
Payer: MEDICARE

## 2025-04-15 DIAGNOSIS — I67.1 SACCULAR ANEURYSM: Primary | ICD-10-CM

## 2025-04-15 DIAGNOSIS — R41.3 MEMORY DEFICITS: ICD-10-CM

## 2025-04-15 PROCEDURE — 95819 EEG AWAKE AND ASLEEP: CPT | Performed by: PSYCHIATRY & NEUROLOGY

## 2025-04-15 NOTE — PROGRESS NOTES
ELECTROENCEPHALOGRAM FOR [facility] NEUROLOGY     EEG: Routine  Pt Class: Outpatient    DATE(s) OF EE/15/25  DATE OF REPORT: 04/15/25    MRN: [id number]  YOB: 1950  EEG No.   ICD-10: R41.82 - Altered mental status  CPT Code: 55149 (20-40 minutes, awake and asleep)  CSN: [id number]    HISTORY: Saccular aneurysm     MEDICATIONS THAT COULD AFFECT EEG: No ASM      TECHNICAL SUMMARY  This is a digital EEG recorded with all input channels reviewed with bipolar and referential montages using the modified combinatorial system nomenclature.    DESCRIPTION OF RECORD AND EVENTS  During the maximally alert state a 9 Hz posterior dominant rhythm was seen that was symmetric, reactive to eye opening and well regulated.  There is intermittent 3 to 4 Hz delta slowing, reactive and nonsustained, occurring occasionally over the local area, right frontotemporal region, maximal at F4/F8.  More anteriorly, low voltage frontocentral beta predominated. Drowsiness was characterized by alpha attenuation and increased symmetric frontocentral theta activity. Vertex sharp transients were seen. Stage 2 sleep was reached characterized by symmetric sleep spindles.    HV: Hyperventilation was not performed due to health condition.    PHOTIC STIMULATION: Photic stimulation was done from 1-21 Hz;  photic driving was seen; photoparoxysmal responses were absent.    ELECTROCARDIOGRAM: Normal Sinus Rhythm    IMPRESSION: Abnormal EEG in Awake and Asleep states.  - Occasional intermittent focal slowing over the right frontotemporal region, delta range, reactive    CLINICAL CORRELATION: This EEG shows a focal slowing over the right frontotemporal region, suggesting an underlying lesion, which in this particular example may be due to an old infarction or aneurysm coiling.  This is not an epileptogenic finding.    There have been no epileptiform discharges or electrographic seizures.       MD Scott Gabriel [facility]